# Patient Record
Sex: FEMALE | Race: WHITE | NOT HISPANIC OR LATINO | URBAN - METROPOLITAN AREA
[De-identification: names, ages, dates, MRNs, and addresses within clinical notes are randomized per-mention and may not be internally consistent; named-entity substitution may affect disease eponyms.]

---

## 2020-09-18 ENCOUNTER — OFFICE (OUTPATIENT)
Dept: URBAN - METROPOLITAN AREA TELEHEALTH 7 | Facility: TELEHEALTH | Age: 50
End: 2020-09-18
Payer: MEDICAID

## 2020-09-18 VITALS — WEIGHT: 143 LBS | HEIGHT: 63 IN

## 2020-09-18 DIAGNOSIS — R10.11 RIGHT UPPER QUADRANT PAIN: ICD-10-CM

## 2020-09-18 DIAGNOSIS — R12 HEARTBURN: ICD-10-CM

## 2020-09-18 DIAGNOSIS — R11.0 NAUSEA: ICD-10-CM

## 2020-09-18 DIAGNOSIS — R19.4 CHANGE IN BOWEL HABIT: ICD-10-CM

## 2020-09-18 DIAGNOSIS — R10.13 EPIGASTRIC PAIN: ICD-10-CM

## 2020-09-18 PROCEDURE — 99204 OFFICE O/P NEW MOD 45 MIN: CPT | Mod: 95 | Performed by: INTERNAL MEDICINE

## 2020-09-18 NOTE — SERVICEHPINOTES
PATIENT VERIFIED BY DATE OF BIRTH AND NAME. Patient has been consented for this telecommunication visit.   ORALIA MORRISSEY   is a   50   year old    female who is being seen in consultation at the request of   ALISSON SOLANO   for epigastric pain radiating down as well as RUQ pain. Symptoms seemed to start in March after a sinus infection. Symptoms went away in June and then seemed to come back two weeks ago. She has noted nausea without vomiting. She has noted an increased frequency of BMs, up to 7 times a day. Some days she can have smaller BMs. The consistency of the BMs are soft and sometimes loose. No watery diarrhea. Even on days where she has smaller BMs she will have the urge to defecate more and she feels incomplete evacuation. She had BRBPR in March but nothing since then. No melena. She has noted recent heartburn as well. No dysphagia. She has noted significant bloating at times. She gets hungry but the thought of food makes her nauseous at times. Her weight has been stable. She has tried PPI without much relief. She was taking ibuprofen daily for a while but has recently stopped because of symptoms. U/S and CT without contrast done earlier this year did not reveal any GI pathology. She was in a lot of pain the other day and went to [facility] ED. Bloodwork was unremarkable. CT and U/S were largely unremarkable except for fatty liver. She does have a history of diverticulosis diagnosed on colonoscopy in April 2019 at [facility]. Of note, patient has been diagnosed with IBS in the past (when she was in her 20s).BR

## 2020-09-23 ENCOUNTER — OFFICE (OUTPATIENT)
Dept: URBAN - METROPOLITAN AREA CLINIC 79 | Facility: CLINIC | Age: 50
End: 2020-09-23
Payer: MEDICAID

## 2020-09-23 DIAGNOSIS — R19.4 CHANGE IN BOWEL HABIT: ICD-10-CM

## 2020-09-23 PROCEDURE — 87507 IADNA-DNA/RNA PROBE TQ 12-25: CPT | Performed by: INTERNAL MEDICINE

## 2020-10-02 ENCOUNTER — ON CAMPUS - OUTPATIENT (OUTPATIENT)
Dept: URBAN - METROPOLITAN AREA HOSPITAL 37 | Facility: HOSPITAL | Age: 50
End: 2020-10-02
Payer: MEDICAID

## 2020-10-02 DIAGNOSIS — R10.13 EPIGASTRIC PAIN: ICD-10-CM

## 2020-10-02 DIAGNOSIS — R11.2 NAUSEA WITH VOMITING, UNSPECIFIED: ICD-10-CM

## 2020-10-02 DIAGNOSIS — K21.9 GASTRO-ESOPHAGEAL REFLUX DISEASE WITHOUT ESOPHAGITIS: ICD-10-CM

## 2020-10-02 DIAGNOSIS — K29.60 OTHER GASTRITIS WITHOUT BLEEDING: ICD-10-CM

## 2020-10-02 PROCEDURE — 43239 EGD BIOPSY SINGLE/MULTIPLE: CPT | Performed by: INTERNAL MEDICINE
